# Patient Record
Sex: FEMALE | Race: BLACK OR AFRICAN AMERICAN | NOT HISPANIC OR LATINO | Employment: FULL TIME | URBAN - METROPOLITAN AREA
[De-identification: names, ages, dates, MRNs, and addresses within clinical notes are randomized per-mention and may not be internally consistent; named-entity substitution may affect disease eponyms.]

---

## 2023-11-28 ENCOUNTER — HOSPITAL ENCOUNTER (EMERGENCY)
Facility: HOSPITAL | Age: 18
Discharge: HOME | End: 2023-11-28
Payer: COMMERCIAL

## 2023-11-28 VITALS
OXYGEN SATURATION: 98 % | SYSTOLIC BLOOD PRESSURE: 142 MMHG | DIASTOLIC BLOOD PRESSURE: 75 MMHG | BODY MASS INDEX: 33.12 KG/M2 | WEIGHT: 206.1 LBS | TEMPERATURE: 100.1 F | HEART RATE: 78 BPM | HEIGHT: 66 IN | RESPIRATION RATE: 20 BRPM

## 2023-11-28 DIAGNOSIS — J02.9 SORE THROAT: ICD-10-CM

## 2023-11-28 LAB
ALBUMIN SERPL BCG-MCNC: 4.3 G/DL (ref 3.5–5.2)
ALBUMIN UR-MCNC: 30 MG/DL
ALP SERPL-CCNC: 103 U/L (ref 40–150)
ALT SERPL W P-5'-P-CCNC: 23 U/L (ref 0–50)
ANION GAP SERPL CALCULATED.3IONS-SCNC: 14 MMOL/L (ref 7–15)
APPEARANCE UR: ABNORMAL
AST SERPL W P-5'-P-CCNC: 27 U/L (ref 0–45)
BASOPHILS # BLD AUTO: 0 10E3/UL (ref 0–0.2)
BASOPHILS NFR BLD AUTO: 0 %
BILIRUB SERPL-MCNC: 0.3 MG/DL
BILIRUB UR QL STRIP: NEGATIVE
BUN SERPL-MCNC: 5.9 MG/DL (ref 6–20)
CALCIUM SERPL-MCNC: 9.2 MG/DL (ref 8.6–10)
CHLORIDE SERPL-SCNC: 99 MMOL/L (ref 98–107)
COLOR UR AUTO: YELLOW
CREAT SERPL-MCNC: 0.71 MG/DL (ref 0.51–0.95)
DEPRECATED HCO3 PLAS-SCNC: 23 MMOL/L (ref 22–29)
EGFRCR SERPLBLD CKD-EPI 2021: >90 ML/MIN/1.73M2
EOSINOPHIL # BLD AUTO: 0 10E3/UL (ref 0–0.7)
EOSINOPHIL NFR BLD AUTO: 0 %
ERYTHROCYTE [DISTWIDTH] IN BLOOD BY AUTOMATED COUNT: 13.2 % (ref 10–15)
FLUAV RNA SPEC QL NAA+PROBE: NEGATIVE
FLUBV RNA RESP QL NAA+PROBE: NEGATIVE
GLUCOSE SERPL-MCNC: 115 MG/DL (ref 70–99)
GLUCOSE UR STRIP-MCNC: NEGATIVE MG/DL
GROUP A STREP BY PCR: NOT DETECTED
HCT VFR BLD AUTO: 40.6 % (ref 35–47)
HGB BLD-MCNC: 13.1 G/DL (ref 11.7–15.7)
HGB UR QL STRIP: NEGATIVE
IMM GRANULOCYTES # BLD: 0.1 10E3/UL
IMM GRANULOCYTES NFR BLD: 1 %
KETONES UR STRIP-MCNC: 40 MG/DL
LACTATE SERPL-SCNC: 0.8 MMOL/L (ref 0.7–2)
LEUKOCYTE ESTERASE UR QL STRIP: NEGATIVE
LYMPHOCYTES # BLD AUTO: 1.6 10E3/UL (ref 0.8–5.3)
LYMPHOCYTES NFR BLD AUTO: 10 %
MCH RBC QN AUTO: 24.9 PG (ref 26.5–33)
MCHC RBC AUTO-ENTMCNC: 32.3 G/DL (ref 31.5–36.5)
MCV RBC AUTO: 77 FL (ref 78–100)
MONOCYTES # BLD AUTO: 1.5 10E3/UL (ref 0–1.3)
MONOCYTES NFR BLD AUTO: 10 %
MUCOUS THREADS #/AREA URNS LPF: PRESENT /LPF
NEUTROPHILS # BLD AUTO: 12.1 10E3/UL (ref 1.6–8.3)
NEUTROPHILS NFR BLD AUTO: 79 %
NITRATE UR QL: NEGATIVE
NRBC # BLD AUTO: 0 10E3/UL
NRBC BLD AUTO-RTO: 0 /100
PH UR STRIP: 6.5 [PH] (ref 5–7)
PLATELET # BLD AUTO: 356 10E3/UL (ref 150–450)
POTASSIUM SERPL-SCNC: 3.7 MMOL/L (ref 3.4–5.3)
PROT SERPL-MCNC: 8.2 G/DL (ref 6.4–8.3)
RBC # BLD AUTO: 5.27 10E6/UL (ref 3.8–5.2)
RBC URINE: 2 /HPF
RSV RNA SPEC NAA+PROBE: NEGATIVE
SARS-COV-2 RNA RESP QL NAA+PROBE: NEGATIVE
SODIUM SERPL-SCNC: 136 MMOL/L (ref 135–145)
SP GR UR STRIP: 1.02 (ref 1–1.03)
SQUAMOUS EPITHELIAL: 24 /HPF
UROBILINOGEN UR STRIP-MCNC: 2 MG/DL
WBC # BLD AUTO: 15.4 10E3/UL (ref 4–11)
WBC URINE: 2 /HPF

## 2023-11-28 PROCEDURE — 250N000011 HC RX IP 250 OP 636: Mod: JZ

## 2023-11-28 PROCEDURE — 96372 THER/PROPH/DIAG INJ SC/IM: CPT

## 2023-11-28 PROCEDURE — 99284 EMERGENCY DEPT VISIT MOD MDM: CPT | Mod: 25

## 2023-11-28 PROCEDURE — 96361 HYDRATE IV INFUSION ADD-ON: CPT

## 2023-11-28 PROCEDURE — 36415 COLL VENOUS BLD VENIPUNCTURE: CPT | Performed by: STUDENT IN AN ORGANIZED HEALTH CARE EDUCATION/TRAINING PROGRAM

## 2023-11-28 PROCEDURE — 85025 COMPLETE CBC W/AUTO DIFF WBC: CPT | Performed by: STUDENT IN AN ORGANIZED HEALTH CARE EDUCATION/TRAINING PROGRAM

## 2023-11-28 PROCEDURE — 87637 SARSCOV2&INF A&B&RSV AMP PRB: CPT | Performed by: FAMILY MEDICINE

## 2023-11-28 PROCEDURE — 96374 THER/PROPH/DIAG INJ IV PUSH: CPT

## 2023-11-28 PROCEDURE — 250N000013 HC RX MED GY IP 250 OP 250 PS 637: Performed by: STUDENT IN AN ORGANIZED HEALTH CARE EDUCATION/TRAINING PROGRAM

## 2023-11-28 PROCEDURE — 87651 STREP A DNA AMP PROBE: CPT | Performed by: FAMILY MEDICINE

## 2023-11-28 PROCEDURE — 81001 URINALYSIS AUTO W/SCOPE: CPT | Performed by: STUDENT IN AN ORGANIZED HEALTH CARE EDUCATION/TRAINING PROGRAM

## 2023-11-28 PROCEDURE — 83605 ASSAY OF LACTIC ACID: CPT | Performed by: STUDENT IN AN ORGANIZED HEALTH CARE EDUCATION/TRAINING PROGRAM

## 2023-11-28 PROCEDURE — 80053 COMPREHEN METABOLIC PANEL: CPT | Performed by: STUDENT IN AN ORGANIZED HEALTH CARE EDUCATION/TRAINING PROGRAM

## 2023-11-28 PROCEDURE — 258N000003 HC RX IP 258 OP 636

## 2023-11-28 RX ORDER — DEXAMETHASONE SODIUM PHOSPHATE 4 MG/ML
10 INJECTION, SOLUTION INTRA-ARTICULAR; INTRALESIONAL; INTRAMUSCULAR; INTRAVENOUS; SOFT TISSUE ONCE
Status: COMPLETED | OUTPATIENT
Start: 2023-11-28 | End: 2023-11-28

## 2023-11-28 RX ORDER — IBUPROFEN 100 MG/5ML
400 SUSPENSION, ORAL (FINAL DOSE FORM) ORAL ONCE
Status: COMPLETED | OUTPATIENT
Start: 2023-11-28 | End: 2023-11-28

## 2023-11-28 RX ADMIN — DEXAMETHASONE SODIUM PHOSPHATE 10 MG: 4 INJECTION, SOLUTION INTRA-ARTICULAR; INTRALESIONAL; INTRAMUSCULAR; INTRAVENOUS; SOFT TISSUE at 20:35

## 2023-11-28 RX ADMIN — PENICILLIN G BENZATHINE 1.2 MILLION UNITS: 1200000 INJECTION, SUSPENSION INTRAMUSCULAR at 21:50

## 2023-11-28 RX ADMIN — SODIUM CHLORIDE 1000 ML: 9 INJECTION, SOLUTION INTRAVENOUS at 20:35

## 2023-11-28 RX ADMIN — IBUPROFEN 400 MG: 100 SUSPENSION ORAL at 18:22

## 2023-11-28 ASSESSMENT — ENCOUNTER SYMPTOMS
DIARRHEA: 0
SHORTNESS OF BREATH: 0
SORE THROAT: 1
VOMITING: 0
NAUSEA: 1
ABDOMINAL PAIN: 0
FEVER: 1

## 2023-11-28 ASSESSMENT — ACTIVITIES OF DAILY LIVING (ADL): ADLS_ACUITY_SCORE: 35

## 2023-11-28 NOTE — Clinical Note
Farshad Macario was seen and treated in our emergency department on 11/28/2023.  She may return to work on 12/01/2023.       If you have any questions or concerns, please don't hesitate to call.      Ashlee Abraham PA-C

## 2023-11-28 NOTE — ED TRIAGE NOTES
Pt has had a sore throat, swollen tonsils and fever for past 2 days.  Last APAP at 0800 this am.  Pt is tachycardic in triage, pt reports nausea no emesis.       Triage Assessment (Adult)       Row Name 11/28/23 1734          Triage Assessment    Airway WDL WDL        Respiratory WDL    Respiratory WDL WDL        Skin Circulation/Temperature WDL    Skin Circulation/Temperature WDL WDL        Cardiac WDL    Cardiac WDL X;rhythm     Pulse Rate & Regularity tachycardic        Peripheral/Neurovascular WDL    Peripheral Neurovascular WDL WDL        Cognitive/Neuro/Behavioral WDL    Cognitive/Neuro/Behavioral WDL WDL

## 2023-11-29 NOTE — ED PROVIDER NOTES
EMERGENCY DEPARTMENT ENCOUNTER      NAME: Farshad Macario  AGE: 18 year old female  YOB: 2005  MRN: [id number]  EVALUATION DATE & TIME: No admission date for patient encounter.    PCP: System, Provider Not In    ED PROVIDER: Ashlee Abraham PA-C    Chief Complaint   Patient presents with    Pharyngitis           Fever     FINAL IMPRESSION:  1. Sore throat      MEDICAL DECISION MAKING:    Pertinent Labs & Imaging studies reviewed. (See chart for details)  Farshad Macario is a 18 year old female who presents for evaluation of fever.  Patient with 2-day history of sore throat and fever.  Family members are sick with similar symptoms and are currently being evaluated here as well.  Denies over-the-counter medications.  Denies chest pain, shortness of breath, ear pain, abdominal pain, nausea, vomiting, diarrhea, constipation or urinary symptoms     On my initial evaluation, mildly tachycardic and febrile, remainder of vital signs normal.  Repeat vitals are improved.  On physical exam patient is mildly uncomfortable and ill appearing, but is not toxic.  Oropharynx is erythematous with 1+ symmetric tonsillar enlargement, no exudate.  Uvula is midline, no difficulty tolerating secretions.  She has a dry cough, heart sounds are normal and lungs are clear without wheezing or crackles.  No abdominal tenderness on palpation, no distention, rebound, guarding or masses.  No CVA tenderness bilaterally.  No lower extremity edema bilaterally..     Differential diagnosis includes COVID, influenza, RSV, other viral URI, strep pharyngitis, viral pharyngitis, retropharyngeal abscess, peritonsillar abscess, Jelani's angina, other deep space infection, pneumonia, pleural effusion, pneumothorax,  Emergency department workup included basic labs in addition to lactate, UA, COVID, influenza, RSV, strep swabs. Patient was given ibuprofen, IV Decadron, IV fluids.     Patient initially tachycardic and mildly febrile in  triage, so basic blood work was ordered by triage nurse.  This was notable for leukocytosis of 15, normal lactate.electrolytes are within normal limits, normal creatinine.  Patient was given IV fluids with improvement of her vitals.      Patient with tonsillar enlargement, erythema and exudates.  Her strep test was negative here today.  Sister who is being evaluated with her is positive for strep pharyngitis.  Due to setting of leukocytosis, tachycardia and clinical appearance of tonsils, will treat for strep pharyngitis despite negative test here today.  Had shared decision-making conversation with patient regarding treating with Bicillin versus oral antibiotic therapy, she would like to pursue Bicillin and this was given today.  Tonsils are symmetric without uvula deviation or change to voice, low suspicion for peritonsillar abscess, retropharyngeal abscess or other deep space infection that would require further imaging or intervention here today.  She reports feeling significantly improved after our interventions here today.  Otherwise very reassuring work-up here today.  Patient negative for COVID, influenza, RSV.  Lungs are clear without wheezing or crackles, no vomiting, low suspicion for pneumonia, pleural effusion or pneumothorax that would require chest x-ray imaging here today.  Her urine does not show an infection, does have small amount of ketones, given IV fluids for this likely due to dehydration, low suspicion for UTI or pyelonephritis.  Patient is otherwise clinically well-appearing and vitally stable. no indication for further ER work-up here today. low suspicion for any emergent process that would require further ER intervention or hospital admission.  Provided expectant management as well as strict return precautions.  Provided work note.    Patient has had serial examinations and notes significant improvement.     Patient was discharged in stable condition with treatment plan as below.  Instructed to follow up with primary care provider in 3 days and return to the emergency department with any new or worsening of symptoms. Patient expressed understanding, feels comfortable, and is in agreement with this plan. All questions addressed prior to discharge.    Medical Decision Making    History:  Supplemental history from: Documented in chart, if applicable  External Record(s) reviewed: Documented in chart, if applicable.    Work Up:  Chart documentation includes differential considered and any EKGs or imaging independently interpreted by provider, where specified.  In additional to work up documented, I considered the following work up: Documented in chart, if applicable.    External consultation:  Discussion of management with another provider: Documented in chart, if applicable    Complicating factors:  Care impacted by chronic illness: N/A  Care affected by social determinants of health: N/A    Disposition considerations: Discharge. I prescribed additional prescription strength medication(s) as charted. N/A.    ED COURSE:  8:00 PM  I reviewed the patient's chart. I met with the patient to gather history and to perform my initial exam.   9:38 PM I rechecked the patient and updated them on results. We discussed plan for discharge including treatment plan, follow-up and return precautions to emergency department.  Patient voiced understanding and in agreement with this plan.    At the conclusion of the encounter I discussed the results of all of the tests and the disposition. The questions were answered. The patient or family acknowledged understanding and was agreeable with the care plan.     Voice recognition software was used in the creation of this note. Any grammatical or nonsensical errors are due to inherent errors with the software and are not the intention of the writer.     MEDICATIONS GIVEN IN THE EMERGENCY:  Medications   ibuprofen (ADVIL/MOTRIN) suspension 400 mg (400 mg Oral $Given  11/28/23 1822)   sodium chloride 0.9% BOLUS 1,000 mL (0 mLs Intravenous Stopped 11/28/23 2123)   dexAMETHasone (DECADRON) injection 10 mg (10 mg Intravenous $Given 11/28/23 2035)   penicillin G benzathine (BICILLIN L-A) injection 1.2 Million Units (1.2 Million Units Intramuscular $Given 11/28/23 2150)     NEW PRESCRIPTIONS STARTED AT TODAY'S ER VISIT  There are no discharge medications for this patient.    =================================================================    HPI:    Patient information was obtained from: patient, mother    Use of Interpretor: N/A    Farshad Macario is a 18 year old female UTD on vaccinations with no pertinent history who presents to this ED via walk-in for evaluation of fever. On Sunday 11/26/23, the patient started to have sore throat, swollen tonsil, nausea, and fever. Difficulty swallowing and eating due to pain. Denies taking Tylenol or ibuprofen since yesterday. Family is sick with similar symptoms. Denies vomiting, chest pain, shortness of breath, ear pain, abdominal pain, diarrhea, constipation, urinary problems, medical problems, or any other complaints at this time.    REVIEW OF SYSTEMS:  Review of Systems   Constitutional:  Positive for fever.   HENT:  Positive for sore throat. Negative for ear pain.    Respiratory:  Negative for shortness of breath.    Cardiovascular:  Negative for chest pain.   Gastrointestinal:  Positive for nausea. Negative for abdominal pain, diarrhea and vomiting.   Genitourinary: Negative.         PAST MEDICAL HISTORY:  No past medical history on file.    PAST SURGICAL HISTORY:  No past surgical history on file.    CURRENT MEDICATIONS:    No current facility-administered medications for this encounter.  No current outpatient medications on file.    ALLERGIES:  No Known Allergies    FAMILY HISTORY:  No family history on file.    SOCIAL HISTORY:   Social History     Socioeconomic History    Marital status: Single       VITALS:  Patient Vitals for  "the past 24 hrs:   BP Temp Temp src Pulse Resp SpO2 Height Weight   11/28/23 2203 (!) 142/75 -- -- 78 -- 98 % -- --   11/28/23 2013 139/89 -- -- 91 20 98 % -- --   11/28/23 1733 (!) 149/84 100.1  F (37.8  C) Temporal 111 20 98 % 1.676 m (5' 6\") 93.5 kg (206 lb 1.6 oz)       PHYSICAL EXAM    Constitutional: Well developed, Well nourished, NAD, mildly uncomfortable and ill appearing, but is not toxic.   HENT: Normocephalic, Atraumatic, Bilateral external ears normal, Oropharynx is erythematous with 1+ symmetric tonsillar enlargement, no exudate.  Uvula is midline, no difficulty tolerating secretions. , mucous membranes moist, Nose normal.   Neck: Normal range of motion, No tenderness, Supple, No stridor.  Eyes: PERRL, EOMI, Conjunctiva normal, No discharge.   Respiratory: Normal breath sounds, No respiratory distress, No wheezing, Speaks full sentences easily. Dry cough   Cardiovascular: Normal heart rate, Regular rhythm, No murmurs, No rubs, No gallops. Chest wall nontender.  GI: Soft, No tenderness, No masses, No flank tenderness. No rebound or guarding.  Musculoskeletal: 2+ DP pulses. No edema. No cyanosis, No clubbing. Good range of motion in all major joints. No tenderness to palpation or major deformities noted. No tenderness of the CTLS spine.   Integument: Warm, Dry, No erythema, No rash. No petechiae.  Neurologic: Alert & oriented x 3, Normal motor function, Normal sensory function, No focal deficits noted. Normal gait.  Psychiatric: Affect normal, Judgment normal, Mood normal. Cooperative.    LAB:  All pertinent labs reviewed and interpreted.  Labs Ordered and Resulted from Time of ED Arrival to Time of ED Departure   COMPREHENSIVE METABOLIC PANEL - Abnormal       Result Value    Sodium 136      Potassium 3.7      Carbon Dioxide (CO2) 23      Anion Gap 14      Urea Nitrogen 5.9 (*)     Creatinine 0.71      GFR Estimate >90      Calcium 9.2      Chloride 99      Glucose 115 (*)     Alkaline Phosphatase 103   "    AST 27      ALT 23      Protein Total 8.2      Albumin 4.3      Bilirubin Total 0.3     ROUTINE UA WITH MICROSCOPIC REFLEX TO CULTURE - Abnormal    Color Urine Yellow      Appearance Urine Turbid (*)     Glucose Urine Negative      Bilirubin Urine Negative      Ketones Urine 40 (*)     Specific Gravity Urine 1.022      Blood Urine Negative      pH Urine 6.5      Protein Albumin Urine 30 (*)     Urobilinogen Urine 2.0 (*)     Nitrite Urine Negative      Leukocyte Esterase Urine Negative      Mucus Urine Present (*)     RBC Urine 2      WBC Urine 2      Squamous Epithelials Urine 24 (*)    CBC WITH PLATELETS AND DIFFERENTIAL - Abnormal    WBC Count 15.4 (*)     RBC Count 5.27 (*)     Hemoglobin 13.1      Hematocrit 40.6      MCV 77 (*)     MCH 24.9 (*)     MCHC 32.3      RDW 13.2      Platelet Count 356      % Neutrophils 79      % Lymphocytes 10      % Monocytes 10      % Eosinophils 0      % Basophils 0      % Immature Granulocytes 1      NRBCs per 100 WBC 0      Absolute Neutrophils 12.1 (*)     Absolute Lymphocytes 1.6      Absolute Monocytes 1.5 (*)     Absolute Eosinophils 0.0      Absolute Basophils 0.0      Absolute Immature Granulocytes 0.1      Absolute NRBCs 0.0     INFLUENZA A/B, RSV, & SARS-COV2 PCR - Normal    Influenza A PCR Negative      Influenza B PCR Negative      RSV PCR Negative      SARS CoV2 PCR Negative     LACTIC ACID WHOLE BLOOD - Normal    Lactic Acid 0.8     GROUP A STREPTOCOCCUS PCR THROAT SWAB - Normal    Group A strep by PCR Not Detected         RADIOLOGY:  Reviewed all pertinent imaging. Please see official radiology report.  No orders to display       EKG:    None     PROCEDURES:   None     Diagnosis:  1. Sore throat      Beau HURST, am serving as a scribe to document services personally performed by Ashlee Abraham PA-C based on my observation and the provider's statements to me. I, Ashlee Abraham PA-C attest that Beau Perez is acting in a scribe capacity, has observed my  performance of the services and has documented them in accordance with my direction.    Ashlee Abraham PA-C  Emergency Medicine  [facility]  11/28/2023       Ashlee Abraham PA-C  11/29/23 0058

## 2023-11-29 NOTE — DISCHARGE INSTRUCTIONS
You were seen in the ER for cough, sore throat, fever, chills.  Although your tests were negative here today, we will treat you for strep throat based on your exam, your markers for infection for elevated and as your sister was positive for strep throat.  You were given a one-time dose of an antibiotic and you do not need to go home on any antibiotic medication.  Take Tylenol and ibuprofen for pain or fevers.  Stay well-hydrated and drink plenty of fluids.  Follow-up with your primary care provider and return to the ER for any new or worsening symptoms.